# Patient Record
Sex: FEMALE | Race: WHITE | NOT HISPANIC OR LATINO | Employment: FULL TIME | URBAN - METROPOLITAN AREA
[De-identification: names, ages, dates, MRNs, and addresses within clinical notes are randomized per-mention and may not be internally consistent; named-entity substitution may affect disease eponyms.]

---

## 2017-09-15 ENCOUNTER — AMBULATORY - RIVER FALLS (OUTPATIENT)
Dept: FAMILY MEDICINE | Facility: CLINIC | Age: 22
End: 2017-09-15

## 2018-05-27 ENCOUNTER — HEALTH MAINTENANCE LETTER (OUTPATIENT)
Age: 23
End: 2018-05-27

## 2019-08-09 ENCOUNTER — OFFICE VISIT (OUTPATIENT)
Dept: PEDIATRIC HEMATOLOGY/ONCOLOGY | Facility: CLINIC | Age: 24
End: 2019-08-09
Attending: NURSE PRACTITIONER
Payer: COMMERCIAL

## 2019-08-09 VITALS
SYSTOLIC BLOOD PRESSURE: 115 MMHG | HEART RATE: 68 BPM | RESPIRATION RATE: 14 BRPM | BODY MASS INDEX: 21.1 KG/M2 | WEIGHT: 114.64 LBS | TEMPERATURE: 99 F | HEIGHT: 62 IN | DIASTOLIC BLOOD PRESSURE: 82 MMHG | OXYGEN SATURATION: 100 %

## 2019-08-09 DIAGNOSIS — Z85.858 HISTORY OF NEUROBLASTOMA: Primary | ICD-10-CM

## 2019-08-09 DIAGNOSIS — Z92.21 STATUS POST CHEMOTHERAPY: ICD-10-CM

## 2019-08-09 ASSESSMENT — MIFFLIN-ST. JEOR: SCORE: 1238.38

## 2019-08-09 ASSESSMENT — PAIN SCALES - GENERAL: PAINLEVEL: NO PAIN (0)

## 2019-08-09 NOTE — PROGRESS NOTES
We had the pleasure of seeing your patient, Cathie Miller, at the [facility] for childhood cancer survivors. The following is a summary of your patient's cancer, therapy, current history, and physical findings followed by assessment and long-term followup recommendations.     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 She was treated at the [facility] under the direction of Dr. Harris Pollock. [place] [facility] [place] on [place] 14, 1999. Cathie had one major surgical procedure involving resection of the right thoracic/paraspinal tumor on August 2, 1999. Additionally, she received chemotherapy as part of her treatment, and medications were as follows:   1. Cyclophosphamide intravenously with a cumulative dose of 1,980 mg/m2.   2. Etoposide intravenously with a cumulative dose of 1,080 mg/m2.   3. Doxorubicin intravenously with a cumulative dose of 60 mg/m2.   4. Carboplatin intravenously with a cumulative dose of 1.62 gm/m2.   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     History of Present Illness: Cathie reports to the visit alone.  Her parents are in the waiting room.  She was last seen 3 years ago.  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  She got a job with [employer] in [facility].  She starts at the end of this month.  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  She still notices this bump on her scalp but it has been there for years.  It has not changed.  Saw PCP last week and had normal UA.  BP at the upper limits.   No fever, night sweats or weight loss.  No joint pain.   Cathie denies recent illnesses or hospitalizations. The  patient denies bruising, bleeding, or swollen lymph nodes.  She has been having regular follow up with dermatology for her moles and acne.    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 There is a very mild ptosis and miosis on the right which is followed by ophthalmology yearly. She wears corrective lenses and has been doing so for the past 13 years.   Has some issues with dry eyes.   She denies problems with hearing.  Cathie goes to the dentist twice a year and has no problems with dental caries or gum bleeding.  She no longer has braces.   She denies exercise intolerance, dizziness or fainting spells. There are no abdominal, chest, or back pains. She denies fractures. Immunizations are  up-to-date. Appetite is good and sleeping well.      Review of Systems:  General:  No acute distress  Skin: moles, acne, small lump on left scalp  Eyes: contacts  Ears/Nose/Throat: negative  Respiratory: No shortness of breath, dyspnea on exertion, cough, or hemoptysis  Cardiovascular: negative  Gastrointestinal: negative  Genitourinary: negative  Musculoskeletal: negative  Neurologic: negative  Psychiatric: negative  Hematologic/Lymphatic/Immunologic: negative  Endocrine: negative    Social History: Cathie completed her associates degree in Nursing program and has a job with  with [employer] that starts at the end of the month.  Cathie started her menstrual cycles in 2011.  She denies any heavy bleeding or cramping.   She is very active at work and taking long walks.  Family History: MGM with MI .  Cathie's maternal great-grandmother was diagnosed with diabetes.     Current Medications:   Current Outpatient Medications   Medication     levonorgestrel-ethinyl estradiol (LUTERA) 0.1-20 MG-MCG per tablet     SPIRONOLACTONE PO     No current facility-administered medications for this visit.        Allergies:      Allergies   Allergen Reactions     Cats   "    Hay Fever & [A.R.M.]      Morphine      restless and agitated         Physical Examination: /82   Pulse 68   Temp 99  F (37.2  C) (Oral)   Resp 14   Ht 1.583 m (5' 2.32\")   Wt 52 kg (114 lb 10.2 oz)   SpO2 100%   BMI 20.75 kg/m      Wt Readings from Last 3 Encounters:   08/09/19 52 kg (114 lb 10.2 oz)   08/22/16 50.7 kg (111 lb 12.4 oz) (19 %)*   07/22/14 51.9 kg (114 lb 6.7 oz) (33 %)*     * Growth percentiles are based on CDC (Girls, 2-20 Years) data.     Ht Readings from Last 2 Encounters:   08/09/19 1.583 m (5' 2.32\")   08/22/16 1.582 m (5' 2.28\") (22 %)*     * Growth percentiles are based on CDC (Girls, 2-20 Years) data.     Normalized BMI data available only for age 0 to 20 years.      In general, Cathie appears to be in no acute distress. Left frontoparietal scalp with ~2x2cm defect noted-stable.  No overlying erythema or tenderness.   Sclerae are anicteric. Pupils are round and reactive to light, L>R. Bilateral tympanic membranes are within normal limits. Oropharynx is clear without lesions. Neck is supple, and thyroid is nonpalpable. Lungs are clear to auscultation bilaterally. Heart has regular rate and rhythm with no murmurs, rubs, or gallops. Abdomen is nondistended, nontender, and soft. No hepatosplenomegaly or palpable masses noted. Genitourinary exam deferred. Skin exam reveals no abnormal moles. No palpable masses are noted.    There is no palpable cervical, axillary, or inguinal lymphadenopathy.     Laboratory Studies:  None today.  Reviewed  UA from last week at PCP and WNL        Echocardiogram 2016  Interpretation Summary                                                                        Study ID: [id number]                                                [facility] [address] [address]     "                                           Phone: [phone]                               Pediatric Echocardiogram     ______________________________________________________________________________  Name: ANETA CHAMBERS  Study Date: 2016 10:01 AM                    Patient Location: [facility]  MRN: [id number]                                    Age: 21 yrs  : 1995                                    BP: 122/63 mmHg  Gender: Female                                     HR: 57  Patient Class: Outpatient                          Height: 158 cm  Ordering Provider: BRYN STOUT                       Weight: 51 kg  Referring Provider: BRYN STOUT LBSA: 1                    .5 m2  Performed By: Dori Noguera  Report approved by: Mackenzie Hernández MD  Reason For Study: , Personal history of malignant neoplasm of other endocrine  glan     ______________________________________________________________________________  CONCLUSIONS  Normal left and right ventricular systolic function. The calculated biplane  left ventricular ejection fraction is 67%. Estimated right ventricular  systolic pressure is 20 mmHg plus right atrial pressure.     ______________________________________________________________________________     Technical information:  A complete two dimensional, MMODE, spectral and color Doppler transthoracic  echocardiogram is performed. The study quality is good. Images are obtained  from parasternal, apical, subcostal and suprasternal notch views. ECG tracing  shows regular rhythm.     Segmental Anatomy:  There is normal atrial arrangement, with concordant atrioventricular and  ventriculoarterial connections.     Systemic and pulmonary veins:  Normal coronary sinus. The systemic venous return is not evaluated in this  study. The pulmonary venous return is not evaluated.     Atria and atrial septum:  Normal right atrial size. The left atrium is normal in size.  Atrioventricular valves:  The tricuspid valve is  normal in appearance and motion. Trivial tricuspid  valve insufficiency. Estimated right ventricular systolic pressure is 20 mmHg  plus right atrial pressure. The mitral valve is normal in appearance and  motion. There is no mitral valve insufficiency.     Ventricles and Ventricular Septum:  Normal right ventricular size. Normal right ventricular systolic function.  Normal left ventricular size. Normal left ventricular systolic function. The  calculated biplane left ventricular ejection fraction is 67 %.     Outflow tracts:  Normal great artery relationship. There is unobstructed flow through the  right ventricular outflow tract. The pulmonary valve motion is normal. There  is normal flow across the pulmonary valve. Trivial pulmonary valve  insufficiency. There is unobstructed flow through the left ventricular  outflow tract. Tricuspid aortic valve with normal appearance and motion.  There is normal flow across the aortic valve.     Great arteries:  The main pulmonary artery has normal appearance. There is unobstructed flow  in the main pulmonary artery. The pulmonary artery bifurcation is normal.  There is unobstructed flow in both branch pulmonary arteries. Normal  ascending aorta. The aortic arch appears normal. There is a left aortic arch  with normal branching pattern. There is unobstructed antegrade flow in the  ascending, transverse arch, descending thoracic and abdominal aorta.     Effusions, catheters, cannulas and leads:  No pericardial effusion.     MMode/2D Measurements & Calculations  LA dimension: 2.8 cm                  Ao root diam: 1.8 cm  LA/Ao: 1.5  Doppler Measurements & Calculations  MV E max amy: 90.7 cm/sec              Ao V2 max: 106.1 cm/sec  MV A max amy: 48.1 cm/sec              Ao max P.5 mmHg  MV E/A: 1.9  LV V1 max: 101.8 cm/sec                PA V2 max: 87.1 cm/sec  LV V1 max P.1 mmHg                 PA max PG: 3.0 mmHg  RV V1 max: 87.8 cm/sec                 TR max amy: 224.4  cm/sec  RV V1 max PG: 3.1 mmHg                 TR max P.1 mmHg  LPA max amy: 86.4 cm/sec  LPA max PG: 3.0 mmHg  RPA max amy: 73.4 cm/sec  RPA max P.2 mmHg     [place] 2D Z-SCORE VALUES  +----------------+------+-------+---------+------------+  :Measurement Name:Value :Z-Score:Predicted:Normal Range:  +----------------+------+-------+---------+------------+  :LVLd apical(4ch):7.8 cm:0.94   :7.2      :6.1 - 8.4   :  +----------------+------+-------+---------+------------+  :LVLs apical(4ch):6.3 cm:0.68   :5.9      :4.9 - 6.9   :  +----------------+------+-------+---------+------------+  [place] Z-Scores (Measurements & Calculations)  +----------------+----------+-------+---------+------------+  :Measurement Name:Value     :Z-Score:Predicted:Normal Range:  +----------------+----------+-------+---------+------------+  :IVSd(MM)        :0.60 cm   :-2.0   :0.87     :0.62 - 1.13 :  +----------------+----------+-------+---------+------------+  :IVSs(MM)        :0.91 cm   :-1.9   :1.2      :0.89 - 1.52 :  +----------------+----------+-------+---------+------------+  :LVIDd(MM)       :4.6 cm    :-0.09  :4.7      :4.0 - 5.3   :  +----------------+----------+-------+---------+------------+  :LVIDs(MM)       :2.8 cm    :-0.70  :3.0      :2.4 - 3.6   :  +----------------+----------+-------+---------+------------+  :LVPWd(MM)       :0.57 cm   :-2.2   :0.82     :0.60 - 1.04 :  +----------------+----------+-------+---------+------------+  :LVPWs(MM)       :1.3 cm    :-0.48  :1.4      :1.1 - 1.7   :  +----------------+----------+-------+---------+------------+  :LV mass(C)d(MM) :81.1 grams:-2.2   :125.6    :85.7 - 184.2:  +----------------+----------+-------+---------+------------+  :FS(MM)          :39.9 %    :1.3    :34.9     :28.7 - 42.6 :  +----------------+----------+-------+---------+------------+           Report approved by: Krys Lynch 2016 01:11 PM                    Assessment, Plan, and Long-Term  Followup Recommendations: Overall, Cathie is doing extremely well status post her treatment for neuroblastoma that was approximately 19 years ago. BP was at the upper limits of normal but ok on the repeat. The following are our late effects recommendations.   1. Risk for recurrence: Cathie is currently 21 years status post treatment for her low-risk neuroblastoma. Given the distance from her diagnosis of the primary malignancy, the likelihood of recurrence is extremely low.    2. Psychosocial effects, learning, and memory: Cathie was treated with chemotherapy at a young age. However, she is doing extremely well in school. She has no concerns regarding her mood, and we feel she is at low risk for any psychosocial difficulties.     3. Risk for dental abnormalities: Cathie received chemotherapy at a young age which makes her at a slightly increased risk of having dental problems, including increased dental caries. Cathie currently has no cavities and reports brushing and visiting the dentist as per recommendations. She should continue to have dental examination and general cleaning every six months.   4. Risk for cardiac toxicities: Cathie received a low amount of doxorubicin in the area of 60 mg/m2 at the age of 4. Per the [facility] guidelines, we recommend echocardiograms be completed every five years. She had an echocardiogram completed in 2011 which is within normal limits.  Repeat done today and normal.  We will perform again in 5 years.  5. Risk for kidney problems: Cathie should have an annual physical examination, including urinalysis and blood pressure monitoring.  Cathie was found to have benign orthostatic proteinuria at a prior visit. BMP and UA done with PCP recently normal.  She should have a UA yearly to assess her renal function.  6. Endocrinology issues: Cathie received a small amount of alkylating agents which has sometimes been found to affect fertility and pubertal progression. Cathie is having normal  menstrual cycles.  If her cycles become irregular we would recommend checking gonadotropins.   7. Risk for hearing loss: Cathie was treated with the heavy metal agent carboplatin which does put patients at risk for high-frequency hearing loss. Cathie does not report any problems with her hearing, and she has been doing extremely well. We would recommend she have an audiogram should she notice any changes in her hearing.   8.  Katiuska's syndrome:  Mild and no current issues.  9.  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 Stable compared with prior.    It was a pleasure to see Cathie and her family today in the Long-Term Follow-Up Clinic. We certainly appreciate the opportunity to participate in your patient's care. If you have any questions or comments, please do not hesitate to contact us at [phone]. We ask that Cathie return to our Long-Term Follow-Up Clinic in 2 year's time.  We will get an echo at that visit.    Brii Foreman MSN, APRN, CPNP-AC, CPON  Department of Pediatrics  Division of Hematology/ Oncology

## 2019-08-09 NOTE — LETTER
8/9/2019      RE: Cathie Miller   [facility] [id number]       We had the pleasure of seeing your patient, Cathie Miller, at the [facility]Term [facility] for childhood cancer survivors. The following is a summary of your patient's cancer, therapy, current history, and physical findings followed by assessment and long-term followup recommendations.     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 She was treated at the [facility] under the direction of Dr. Harris Pollock. Cathie was treated on study [facility] , and treatment was completed on October 14, 1999. Cathie had one major surgical procedure involving resection of the right thoracic/paraspinal tumor on August 2, 1999. Additionally, she received chemotherapy as part of her treatment, and medications were as follows:   1. Cyclophosphamide intravenously with a cumulative dose of 1,980 mg/m2.   2. Etoposide intravenously with a cumulative dose of 1,080 mg/m2.   3. Doxorubicin intravenously with a cumulative dose of 60 mg/m2.   4. Carboplatin intravenously with a cumulative dose of 1.62 gm/m2.   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     History of Present Illness: Cathie reports to the visit alone.  Her parents are in the waiting room.  She was last seen 3 years ago.  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  She got a job with [employer] in [facility].  She starts at the end of this month.  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  She still notices this bump on her scalp but it has been there for years.  It has not changed.  Saw PCP last week and had normal UA.  BP at the upper limits.   No fever, night sweats or weight  loss.  No joint pain.   Cathie denies recent illnesses or hospitalizations. The patient denies bruising, bleeding, or swollen lymph nodes.  She has been having regular follow up with dermatology for her moles and acne.    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 There is a very mild ptosis and miosis on the right which is followed by ophthalmology yearly. She wears corrective lenses and has been doing so for the past 13 years.   Has some issues with dry eyes.   She denies problems with hearing.  Cathie goes to the dentist twice a year and has no problems with dental caries or gum bleeding.  She no longer has braces.   She denies exercise intolerance, dizziness or fainting spells. There are no abdominal, chest, or back pains. She denies fractures. Immunizations are  up-to-date. Appetite is good and sleeping well.      Review of Systems:  General:  No acute distress  Skin: moles, acne, small lump on left scalp  Eyes: contacts  Ears/Nose/Throat: negative  Respiratory: No shortness of breath, dyspnea on exertion, cough, or hemoptysis  Cardiovascular: negative  Gastrointestinal: negative  Genitourinary: negative  Musculoskeletal: negative  Neurologic: negative  Psychiatric: negative  Hematologic/Lymphatic/Immunologic: negative  Endocrine: negative    Social History: Cathie completed her associates degree in Nursing program and has a job with  with [employer] that starts at the end of the month.  Cathie started her menstrual cycles in 2011.  She denies any heavy bleeding or cramping.   She is very active at work and taking long walks.  Family History: MGM with MI .  Cathie's maternal great-grandmother was diagnosed with diabetes.     Current Medications:   Current Outpatient Medications   Medication     levonorgestrel-ethinyl estradiol (LUTERA) 0.1-20 MG-MCG per tablet     SPIRONOLACTONE PO     No current facility-administered medications  "for this visit.        Allergies:      Allergies   Allergen Reactions     Cats      Hay Fever & [A.R.M.]      Morphine      restless and agitated         Physical Examination: /82   Pulse 68   Temp 99  F (37.2  C) (Oral)   Resp 14   Ht 1.583 m (5' 2.32\")   Wt 52 kg (114 lb 10.2 oz)   SpO2 100%   BMI 20.75 kg/m       Wt Readings from Last 3 Encounters:   08/09/19 52 kg (114 lb 10.2 oz)   08/22/16 50.7 kg (111 lb 12.4 oz) (19 %)*   07/22/14 51.9 kg (114 lb 6.7 oz) (33 %)*     * Growth percentiles are based on CDC (Girls, 2-20 Years) data.     Ht Readings from Last 2 Encounters:   08/09/19 1.583 m (5' 2.32\")   08/22/16 1.582 m (5' 2.28\") (22 %)*     * Growth percentiles are based on CDC (Girls, 2-20 Years) data.     Normalized BMI data available only for age 0 to 20 years.      In general, Cathie appears to be in no acute distress. Left frontoparietal scalp with ~2x2cm defect noted-stable.  No overlying erythema or tenderness.   Sclerae are anicteric. Pupils are round and reactive to light, L>R. Bilateral tympanic membranes are within normal limits. Oropharynx is clear without lesions. Neck is supple, and thyroid is nonpalpable. Lungs are clear to auscultation bilaterally. Heart has regular rate and rhythm with no murmurs, rubs, or gallops. Abdomen is nondistended, nontender, and soft. No hepatosplenomegaly or palpable masses noted. Genitourinary exam deferred. Skin exam reveals no abnormal moles. No palpable masses are noted.    There is no palpable cervical, axillary, or inguinal lymphadenopathy.     Laboratory Studies:  None today.  Reviewed  UA from last week at PCP and WNL        Echocardiogram 2016  Interpretation Summary                                                                        Study ID: [id number]                                                [facility]                                                 [address]                                               [place] [address]                                               Phone: [phone]                               Pediatric Echocardiogram     ______________________________________________________________________________  Name: ANETA CHAMBERS  Study Date: 2016 10:01 AM                    Patient Location: [facility]  MRN: [id number]                                    Age: 21 yrs  : 1995                                    BP: 122/63 mmHg  Gender: Female                                     HR: 57  Patient Class: Outpatient                          Height: 158 cm  Ordering Provider: BRYN STOUT                       Weight: 51 kg  Referring Provider: BRYN STOUT LBSA: 1                    .5 m2  Performed By: Dori Noguera  Report approved by: Mackenzie Hernández MD  Reason For Study: , Personal history of malignant neoplasm of other endocrine  glan     ______________________________________________________________________________  CONCLUSIONS  Normal left and right ventricular systolic function. The calculated biplane  left ventricular ejection fraction is 67%. Estimated right ventricular  systolic pressure is 20 mmHg plus right atrial pressure.     ______________________________________________________________________________     Technical information:  A complete two dimensional, MMODE, spectral and color Doppler transthoracic  echocardiogram is performed. The study quality is good. Images are obtained  from parasternal, apical, subcostal and suprasternal notch views. ECG tracing  shows regular rhythm.     Segmental Anatomy:  There is normal atrial arrangement, with concordant atrioventricular and  ventriculoarterial connections.     Systemic and pulmonary veins:  Normal coronary sinus. The systemic venous return is not evaluated in this  study. The pulmonary venous return is not evaluated.     Atria and atrial septum:  Normal right atrial size.  The left atrium is normal in size.  Atrioventricular valves:  The tricuspid valve is normal in appearance and motion. Trivial tricuspid  valve insufficiency. Estimated right ventricular systolic pressure is 20 mmHg  plus right atrial pressure. The mitral valve is normal in appearance and  motion. There is no mitral valve insufficiency.     Ventricles and Ventricular Septum:  Normal right ventricular size. Normal right ventricular systolic function.  Normal left ventricular size. Normal left ventricular systolic function. The  calculated biplane left ventricular ejection fraction is 67 %.     Outflow tracts:  Normal great artery relationship. There is unobstructed flow through the  right ventricular outflow tract. The pulmonary valve motion is normal. There  is normal flow across the pulmonary valve. Trivial pulmonary valve  insufficiency. There is unobstructed flow through the left ventricular  outflow tract. Tricuspid aortic valve with normal appearance and motion.  There is normal flow across the aortic valve.     Great arteries:  The main pulmonary artery has normal appearance. There is unobstructed flow  in the main pulmonary artery. The pulmonary artery bifurcation is normal.  There is unobstructed flow in both branch pulmonary arteries. Normal  ascending aorta. The aortic arch appears normal. There is a left aortic arch  with normal branching pattern. There is unobstructed antegrade flow in the  ascending, transverse arch, descending thoracic and abdominal aorta.     Effusions, catheters, cannulas and leads:  No pericardial effusion.     MMode/2D Measurements & Calculations  LA dimension: 2.8 cm                  Ao root diam: 1.8 cm  LA/Ao: 1.5  Doppler Measurements & Calculations  MV E max amy: 90.7 cm/sec              Ao V2 max: 106.1 cm/sec  MV A max amy: 48.1 cm/sec              Ao max P.5 mmHg  MV E/A: 1.9  LV V1 max: 101.8 cm/sec                PA V2 max: 87.1 cm/sec  LV V1 max P.1 mmHg                  PA max PG: 3.0 mmHg  RV V1 max: 87.8 cm/sec                 TR max amy: 224.4 cm/sec  RV V1 max PG: 3.1 mmHg                 TR max P.1 mmHg  LPA max amy: 86.4 cm/sec  LPA max PG: 3.0 mmHg  RPA max amy: 73.4 cm/sec  RPA max P.2 mmHg     [place] 2D Z-SCORE VALUES  +----------------+------+-------+---------+------------+  :Measurement Name:Value :Z-Score:Predicted:Normal Range:  +----------------+------+-------+---------+------------+  :LVLd apical(4ch):7.8 cm:0.94   :7.2      :6.1 - 8.4   :  +----------------+------+-------+---------+------------+  :LVLs apical(4ch):6.3 cm:0.68   :5.9      :4.9 - 6.9   :  +----------------+------+-------+---------+------------+  [place] Z-Scores (Measurements & Calculations)  +----------------+----------+-------+---------+------------+  :Measurement Name:Value     :Z-Score:Predicted:Normal Range:  +----------------+----------+-------+---------+------------+  :IVSd(MM)        :0.60 cm   :-2.0   :0.87     :0.62 - 1.13 :  +----------------+----------+-------+---------+------------+  :IVSs(MM)        :0.91 cm   :-1.9   :1.2      :0.89 - 1.52 :  +----------------+----------+-------+---------+------------+  :LVIDd(MM)       :4.6 cm    :-0.09  :4.7      :4.0 - 5.3   :  +----------------+----------+-------+---------+------------+  :LVIDs(MM)       :2.8 cm    :-0.70  :3.0      :2.4 - 3.6   :  +----------------+----------+-------+---------+------------+  :LVPWd(MM)       :0.57 cm   :-2.2   :0.82     :0.60 - 1.04 :  +----------------+----------+-------+---------+------------+  :LVPWs(MM)       :1.3 cm    :-0.48  :1.4      :1.1 - 1.7   :  +----------------+----------+-------+---------+------------+  :LV mass(C)d(MM) :81.1 grams:-2.2   :125.6    :85.7 - 184.2:  +----------------+----------+-------+---------+------------+  :FS(MM)          :39.9 %    :1.3    :34.9     :28.7 - 42.6 :  +----------------+----------+-------+---------+------------+           Report approved by: Mackenzie  Krys Hernández 08/22/2016 01:11 PM                    Assessment, Plan, and Long-Term Followup Recommendations: Overall, Cathie is doing extremely well status post her treatment for neuroblastoma that was approximately 19 years ago. BP was at the upper limits of normal but ok on the repeat. The following are our late effects recommendations.   1. Risk for recurrence: Cathie is currently 21 years status post treatment for her low-risk neuroblastoma. Given the distance from her diagnosis of the primary malignancy, the likelihood of recurrence is extremely low.    2. Psychosocial effects, learning, and memory: Cathie was treated with chemotherapy at a young age. However, she is doing extremely well in school. She has no concerns regarding her mood, and we feel she is at low risk for any psychosocial difficulties.     3. Risk for dental abnormalities: Cathie received chemotherapy at a young age which makes her at a slightly increased risk of having dental problems, including increased dental caries. Cathie currently has no cavities and reports brushing and visiting the dentist as per recommendations. She should continue to have dental examination and general cleaning every six months.   4. Risk for cardiac toxicities: Cathie received a low amount of doxorubicin in the area of 60 mg/m2 at the age of 4. Per the [facility] guidelines, we recommend echocardiograms be completed every five years. She had an echocardiogram completed in 2011 which is within normal limits.  Repeat done today and normal.  We will perform again in 5 years.  5. Risk for kidney problems: Cathie should have an annual physical examination, including urinalysis and blood pressure monitoring.  Cathie was found to have benign orthostatic proteinuria at a prior visit. BMP and UA done with PCP recently normal.  She should have a UA yearly to assess her renal function.  6. Endocrinology issues: Cathie received a small amount of alkylating agents which has  sometimes been found to affect fertility and pubertal progression. Cathie is having normal menstrual cycles.  If her cycles become irregular we would recommend checking gonadotropins.   7. Risk for hearing loss: Cathie was treated with the heavy metal agent carboplatin which does put patients at risk for high-frequency hearing loss. Cathie does not report any problems with her hearing, and she has been doing extremely well. We would recommend she have an audiogram should she notice any changes in her hearing.   8.  Katiuska's syndrome:  Mild and no current issues.  9.  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 Stable compared with prior.    It was a pleasure to see Cathie and her family today in the Long-Term Follow-Up Clinic. We certainly appreciate the opportunity to participate in your patient's care. If you have any questions or comments, please do not hesitate to contact us at [phone]. We ask that Cathie return to our Long-Term Follow-Up Clinic in 2 year's time.  We will get an echo at that visit.    Brii Foreman MSN, APRN, CPNP-AC, CPON  Department of Pediatrics  Division of Hematology/ Oncology

## 2019-11-02 ENCOUNTER — HEALTH MAINTENANCE LETTER (OUTPATIENT)
Age: 24
End: 2019-11-02

## 2020-11-14 ENCOUNTER — HEALTH MAINTENANCE LETTER (OUTPATIENT)
Age: 25
End: 2020-11-14

## 2021-04-14 DIAGNOSIS — Z91.89 AT RISK FOR CARDIOMYOPATHY: ICD-10-CM

## 2021-04-14 DIAGNOSIS — Z85.858 HISTORY OF NEUROBLASTOMA: Primary | ICD-10-CM

## 2021-04-14 DIAGNOSIS — Z92.21 STATUS POST CHEMOTHERAPY: ICD-10-CM

## 2021-04-20 ENCOUNTER — HOSPITAL ENCOUNTER (OUTPATIENT)
Dept: CARDIOLOGY | Facility: CLINIC | Age: 26
Discharge: HOME | End: 2021-04-20
Attending: NURSE PRACTITIONER | Admitting: NURSE PRACTITIONER
Payer: COMMERCIAL

## 2021-04-20 ENCOUNTER — OFFICE VISIT (OUTPATIENT)
Dept: PEDIATRIC HEMATOLOGY/ONCOLOGY | Facility: CLINIC | Age: 26
End: 2021-04-20
Attending: NURSE PRACTITIONER
Payer: COMMERCIAL

## 2021-04-20 VITALS
WEIGHT: 113.1 LBS | SYSTOLIC BLOOD PRESSURE: 136 MMHG | RESPIRATION RATE: 20 BRPM | HEIGHT: 63 IN | DIASTOLIC BLOOD PRESSURE: 80 MMHG | HEART RATE: 65 BPM | OXYGEN SATURATION: 100 % | TEMPERATURE: 98.2 F | BODY MASS INDEX: 20.04 KG/M2

## 2021-04-20 DIAGNOSIS — Z85.858 HISTORY OF NEUROBLASTOMA: ICD-10-CM

## 2021-04-20 DIAGNOSIS — Z92.21 STATUS POST CHEMOTHERAPY: ICD-10-CM

## 2021-04-20 DIAGNOSIS — Z91.89 AT RISK FOR CARDIOMYOPATHY: ICD-10-CM

## 2021-04-20 DIAGNOSIS — Z85.858 HISTORY OF NEUROBLASTOMA: Primary | ICD-10-CM

## 2021-04-20 PROCEDURE — 93306 TTE W/DOPPLER COMPLETE: CPT | Mod: 26 | Performed by: PEDIATRICS

## 2021-04-20 PROCEDURE — G0463 HOSPITAL OUTPT CLINIC VISIT: HCPCS

## 2021-04-20 PROCEDURE — 99214 OFFICE O/P EST MOD 30 MIN: CPT | Performed by: NURSE PRACTITIONER

## 2021-04-20 PROCEDURE — 93306 TTE W/DOPPLER COMPLETE: CPT

## 2021-04-20 RX ORDER — FLUTICASONE PROPIONATE 50 MCG
2 SPRAY, SUSPENSION (ML) NASAL
COMMUNITY
Start: 2020-08-04

## 2021-04-20 ASSESSMENT — MIFFLIN-ST. JEOR: SCORE: 1225.12

## 2021-04-20 ASSESSMENT — PAIN SCALES - GENERAL: PAINLEVEL: NO PAIN (0)

## 2021-04-20 NOTE — LETTER
4/20/2021      RE: Cathie Miller   [facility] [id number]       We had the pleasure of seeing your patient, Cathie Miller, at the [facility] Long[facility]Term Followup Clinic for childhood cancer survivors. The following is a summary of your patient's cancer, therapy, current history, and physical findings followed by assessment and long-term followup recommendations.     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 She was treated at the [facility] under the direction of Dr. Harris Pollock. Cathie was treated on study [facility] , and treatment was completed on October 14, 1999. Cathie had one major surgical procedure involving resection of the right thoracic/paraspinal tumor on August 2, 1999. Additionally, she received chemotherapy as part of her treatment, and medications were as follows:   1. Cyclophosphamide intravenously with a cumulative dose of 1,980 mg/m2.   2. Etoposide intravenously with a cumulative dose of 1,080 mg/m2.   3. Doxorubicin intravenously with a cumulative dose of 60 mg/m2.   4. Carboplatin intravenously with a cumulative dose of 1.62 gm/m2.   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     History of Present Illness: Cathie reports to the visit alone.    She was last seen 2 years ago.  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  She is now working at [employer] the [employer] full time.  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  She still has the bump on her scalp but it hasn't changed.  No fever, night sweats or weight loss.  No joint pain.  Has not had COVID since the pandemic started and did get vaccinated in January 2021.   Cathie denies recent illnesses or hospitalizations. The patient denies bruising, bleeding, or  swollen lymph nodes.  She has been having regular follow up with dermatology for her moles and acne.    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 There is a very mild ptosis and miosis on the right which is followed by ophthalmology yearly. She wears corrective lenses and has been doing so for the past 15 years.   Has some issues with dry eyes.   She denies problems with hearing.  Cathie goes to the dentist twice a year and has no problems with dental caries or gum bleeding.  She no longer has braces.   She denies exercise intolerance, dizziness or fainting spells. There are no abdominal, chest, or back pains. She denies fractures. Immunizations are  up-to-date. Appetite is good and sleeping well.      Review of Systems:  General:  No acute distress  Skin: moles, acne, small lump on left scalp  Eyes: contacts  Ears/Nose/Throat: negative  Respiratory: No shortness of breath, dyspnea on exertion, cough, or hemoptysis  Cardiovascular: negative  Gastrointestinal: negative  Genitourinary: negative  Musculoskeletal: negative  Neurologic: negative  Psychiatric: negative  Hematologic/Lymphatic/Immunologic: negative  Endocrine: negative    Social History: Cathie completed her associates degree in Nursing program and has a job with  with [employer] in the [employer].  Cathie started her menstrual cycles in 2011.  She denies any heavy bleeding or cramping.   She is very active at work and taking long walks.  Family History: MGM with MI .  Cathie's maternal great-grandmother was diagnosed with diabetes. No change.    Current Medications:   Current Outpatient Medications   Medication     fluticasone (FLONASE) 50 MCG/ACT nasal spray     levonorgestrel-ethinyl estradiol (LUTERA) 0.1-20 MG-MCG per tablet     SPIRONOLACTONE PO     No current facility-administered medications for this visit.        Allergies:      Allergies   Allergen  "Reactions     Cats      Hay Fever & [A.R.M.]      Morphine      restless and agitated         Physical Examination: /80 (BP Location: Right arm, Patient Position: Sitting, Cuff Size: Adult Regular)   Pulse 65   Temp 98.2  F (36.8  C) (Oral)   Resp 20   Ht 1.589 m (5' 2.56\")   Wt 51.3 kg (113 lb 1.5 oz)   SpO2 100%   BMI 20.32 kg/m      Wt Readings from Last 3 Encounters:   04/20/21 51.3 kg (113 lb 1.5 oz)   08/09/19 52 kg (114 lb 10.2 oz)   08/22/16 50.7 kg (111 lb 12.4 oz) (19 %, Z= -0.88)*     * Growth percentiles are based on [facility] (Girls, 2-20 Years) data.     Ht Readings from Last 2 Encounters:   04/20/21 1.589 m (5' 2.56\")   08/09/19 1.583 m (5' 2.32\")     Facility age limit for growth percentiles is 20 years.      In general, Cathie appears to be in no acute distress. Left frontoparietal scalp with ~2x2cm defect noted-stable.  No overlying erythema or tenderness.   Sclerae are anicteric. Pupils are round and reactive to light, L>R. Bilateral tympanic membranes are within normal limits. Oropharynx is clear without lesions. Neck is supple, and thyroid is nonpalpable. Lungs are clear to auscultation bilaterally. Heart has regular rate and rhythm with no murmurs, rubs, or gallops. Abdomen is nondistended, nontender, and soft. No hepatosplenomegaly or palpable masses noted. Genitourinary exam deferred. Skin exam reveals no abnormal moles. No palpable masses are noted.    There is no palpable cervical, axillary, or inguinal lymphadenopathy.     Laboratory Studies:  None today.          Echocardiogram 2021  Results for orders placed or performed during the hospital encounter of 04/20/21   Echo Pediatric (TTE) Complete     Status: None    Narrative    [id number]  [id number]  [id number]  [id number]^ARGELIA^BRYN^TERRANCE                                                               Study ID: [id number]                                                 [facility] " [address]                                                [place] [address]                                                Phone: [phone]                                Pediatric Echocardiogram  ______________________________________________________________________________  Name: ANETA CHAMBERS  Study Date: 2021 01:52 PM             Patient Location: [facility]  MRN: [id number]                             Age: 26 yrs  : 1995  Gender: Female                              HR: 73  Patient Class: Outpatient                   Height: 158 cm  Ordering Provider: BRYN STOUT              Weight: 52 kg  Referring Provider: BRYN STOUT             BSA: 1.5 m2  Performed By: Marco Bal  Report approved by: Mackenzie Hernández MD  Reason For Study: Evaluate LVEF  ______________________________________________________________________________  ##### CONCLUSIONS #####  Normal left and right ventricular systolic function. The calculated biplane  left ventricular ejection fraction is 67%. Estimated right ventricular  systolic pressure is 19 mmHg plus right atrial pressure.  ______________________________________________________________________________  Technical information:  A complete two dimensional, MMODE, spectral and color Doppler transthoracic  echocardiogram is performed. The study quality is good. Images are obtained  from parasternal, apical, subcostal and suprasternal notch views. Prior  echocardiogram available for comparison. ECG tracing shows regular rhythm.     Segmental Anatomy:  There is normal atrial arrangement, with concordant atrioventricular and  ventriculoarterial connections.     Systemic and pulmonary veins:  Normal coronary sinus. The systemic venous return is not evaluated in this  study. The pulmonary venous return is not evaluated.     Atria and atrial septum:  Normal right atrial size. The left atrium is normal  in size.     Atrioventricular valves:  The tricuspid valve is normal in appearance and motion. Trivial tricuspid  valve insufficiency. Estimated right ventricular systolic pressure is 19 mmHg  plus right atrial pressure. The mitral valve is normal in appearance and  motion. Trivial mitral valve insufficiency.     Ventricles and Ventricular Septum:  Normal right ventricular size. Normal right ventricular systolic function.  Normal left ventricular size. Normal left ventricular systolic function. The  calculated biplane left ventricular ejection fraction is 69 %.     Outflow tracts:  Normal great artery relationship. There is unobstructed flow through the right  ventricular outflow tract. The pulmonary valve motion is normal. There is  normal flow across the pulmonary valve. Trivial pulmonary valve insufficiency.  There is unobstructed flow through the left ventricular outflow tract.  Tricuspid aortic valve with normal appearance and motion. There is normal flow  across the aortic valve.     Great arteries:  The main pulmonary artery has normal appearance. There is unobstructed flow in  the main pulmonary artery. The pulmonary artery bifurcation is normal. There  is unobstructed flow in both branch pulmonary arteries. Normal ascending  aorta. The aortic arch appears normal. There is a left aortic arch with normal  branching pattern. There is unobstructed antegrade flow in the ascending,  transverse arch, descending thoracic and abdominal aorta.     Effusions, catheters, cannulas and leads:  No pericardial effusion.     MMode/2D Measurements & Calculations  LA dimension: 3.1 cm                       Ao root diam: 2.6 cm     LA/Ao: 1.2                                             LVLd %diff: 6.2 %                                             LVLs %diff: -0.03 %                                             EF(MOD-bp): 69.3 %  LVMI(BSA): 85.6 grams/m2                   LVMI(Height): 37.6  RWT(MM): 0.42     Doppler  Measurements & Calculations  Ao V2 max: 111.1 cm/sec                LV V1 max: 101.2 cm/sec  Ao max P.9 mmHg                    LV V1 max P.1 mmHg  RV V1 max: 82.4 cm/sec  RV V1 max P.7 mmHg     MPA max amy: 83.9 cm/sec  MPA max P.8 mmHg     [place] Z-Scores (Measurements & Calculations)  Measurement NameValue      Z-ScorePredictedNormal Range  IVSd(MM)        0.88 cm    0.02   0.88     0.62 - 1.14  LVIDd(MM)       4.4 cm     -0.82  4.7      4.0 - 5.3  LVIDs(MM)       2.6 cm     -1.4   3.0      2.4 - 3.6  LVPWd(MM)       0.93 cm    0.99   0.82     0.61 - 1.04  LV mass(C)d(MM) 129.4 grams0.08   127.5    86.9 - 187.0  FS(MM)          41.3 %     1.6    34.8     28.3 - 42.7     Report approved by: Krys Lynch 2021 02:19 PM                 Assessment, Plan, and Long-Term Followup Recommendations: Overall, Cathie is doing extremely well status post her treatment for neuroblastoma that was approximately 21 years ago. BP was at the upper limits of normal but ok on the repeat. The following are our late effects recommendations.   1. Risk for recurrence: Cathie is currently 21 years status post treatment for her low-risk neuroblastoma. Given the distance from her diagnosis of the primary malignancy, the likelihood of recurrence is extremely low.    2. Psychosocial effects, learning, and memory: Cathie was treated with chemotherapy at a young age. However, she is doing extremely well in school. She has no concerns regarding her mood, and we feel she is at low risk for any psychosocial difficulties.     3. Risk for dental abnormalities: Cathie received chemotherapy at a young age which makes her at a slightly increased risk of having dental problems, including increased dental caries. Cathie currently has no cavities and reports brushing and visiting the dentist as per recommendations. She should continue to have dental examination and general cleaning every six months.   4. Risk for cardiac toxicities:  Cathie received a low amount of doxorubicin in the area of 60 mg/m2 at the age of 4. Per the [facility] guidelines, we recommend echocardiograms be completed every five years. She had an echocardiogram completed in 2011 which is within normal limits.  Repeat done today and normal.  We will perform again in 5 years.  5. Risk for kidney problems: Cathie should have an annual physical examination, including urinalysis and blood pressure monitoring.  Cathie was found to have benign orthostatic proteinuria at a prior visit. BMP and UA done with PCP recently normal.  No concerns on history.  Will continue to monitor.  6. Endocrinology issues: Cathie received a small amount of alkylating agents which has sometimes been found to affect fertility and pubertal progression. Cathie is having normal menstrual cycles.  If her cycles become irregular we would recommend checking gonadotropins.   7. Risk for hearing loss: Cathie was treated with the heavy metal agent carboplatin which does put patients at risk for high-frequency hearing loss. Cathie does not report any problems with her hearing, and she has been doing extremely well. We would recommend she have an audiogram should she notice any changes in her hearing.   8.  Katiuska's syndrome:  Mild and no current issues.  9.  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 Stable compared with prior.    It was a pleasure to see Cathie and her family today in the Long-Term Follow-Up Clinic. We certainly appreciate the opportunity to participate in your patient's care. If you have any questions or comments, please do not hesitate to contact us at [phone]. We ask that Cathie return to our Long-Term Follow-Up Clinic in 2 year's time.      Brii Foreman MSN, APRN, CPNP-AC, CPON  Department of Pediatrics  Division of Hematology/ Oncology    Review of the result(s) of each unique test - echocardiogram  30 minutes spent on the date of the encounter doing  chart review, history and exam, documentation and further activities per the note          Brii Foreman, APRN CNP

## 2021-04-20 NOTE — PROGRESS NOTES
We had the pleasure of seeing your patient, Cathie Miller, at the [facility] for childhood cancer survivors. The following is a summary of your patient's cancer, therapy, current history, and physical findings followed by assessment and long-term followup recommendations.     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 She was treated at the [facility] under the direction of Dr. Harris Pollock. Cathie was treated on study [facility] , and treatment was completed on October 14, 1999. Cathie had one major surgical procedure involving resection of the right thoracic/paraspinal tumor on August 2, 1999. Additionally, she received chemotherapy as part of her treatment, and medications were as follows:   1. Cyclophosphamide intravenously with a cumulative dose of 1,980 mg/m2.   2. Etoposide intravenously with a cumulative dose of 1,080 mg/m2.   3. Doxorubicin intravenously with a cumulative dose of 60 mg/m2.   4. Carboplatin intravenously with a cumulative dose of 1.62 gm/m2.   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     History of Present Illness: Cathie reports to the visit alone.    She was last seen 2 years ago.  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  She is now working at [employer] in the [employer] full time.  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  She still has the bump on her scalp but it hasn't changed.  No fever, night sweats or weight loss.  No joint pain.  Has not had COVID since the pandemic started and did get vaccinated in January 2021.   Cathie denies recent illnesses or hospitalizations. The patient denies bruising, bleeding, or swollen lymph nodes.  She has been having regular follow up with dermatology for  her moles and acne.    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 There is a very mild ptosis and miosis on the right which is followed by ophthalmology yearly. She wears corrective lenses and has been doing so for the past 15 years.   Has some issues with dry eyes.   She denies problems with hearing.  Cathie goes to the dentist twice a year and has no problems with dental caries or gum bleeding.  She no longer has braces.   She denies exercise intolerance, dizziness or fainting spells. There are no abdominal, chest, or back pains. She denies fractures. Immunizations are  up-to-date. Appetite is good and sleeping well.      Review of Systems:  General:  No acute distress  Skin: moles, acne, small lump on left scalp  Eyes: contacts  Ears/Nose/Throat: negative  Respiratory: No shortness of breath, dyspnea on exertion, cough, or hemoptysis  Cardiovascular: negative  Gastrointestinal: negative  Genitourinary: negative  Musculoskeletal: negative  Neurologic: negative  Psychiatric: negative  Hematologic/Lymphatic/Immunologic: negative  Endocrine: negative    Social History: Cathie completed her associates degree in Nursing program and has a job with  with [employer] in the [employer].  Cathie started her menstrual cycles in 2011.  She denies any heavy bleeding or cramping.   She is very active at work and taking long walks.  Family History: MGM with MI .  Cathie's maternal great-grandmother was diagnosed with diabetes. No change.    Current Medications:   Current Outpatient Medications   Medication     fluticasone (FLONASE) 50 MCG/ACT nasal spray     levonorgestrel-ethinyl estradiol (LUTERA) 0.1-20 MG-MCG per tablet     SPIRONOLACTONE PO     No current facility-administered medications for this visit.        Allergies:      Allergies   Allergen Reactions     Cats      Hay Fever & [A.R.M.]      Morphine      restless and agitated  "        Physical Examination: /80 (BP Location: Right arm, Patient Position: Sitting, Cuff Size: Adult Regular)   Pulse 65   Temp 98.2  F (36.8  C) (Oral)   Resp 20   Ht 1.589 m (5' 2.56\")   Wt 51.3 kg (113 lb 1.5 oz)   SpO2 100%   BMI 20.32 kg/m      Wt Readings from Last 3 Encounters:   04/20/21 51.3 kg (113 lb 1.5 oz)   08/09/19 52 kg (114 lb 10.2 oz)   08/22/16 50.7 kg (111 lb 12.4 oz) (19 %, Z= -0.88)*     * Growth percentiles are based on [facility] (Girls, 2-20 Years) data.     Ht Readings from Last 2 Encounters:   04/20/21 1.589 m (5' 2.56\")   08/09/19 1.583 m (5' 2.32\")     Facility age limit for growth percentiles is 20 years.      In general, Cathie appears to be in no acute distress. Left frontoparietal scalp with ~2x2cm defect noted-stable.  No overlying erythema or tenderness.   Sclerae are anicteric. Pupils are round and reactive to light, L>R. Bilateral tympanic membranes are within normal limits. Oropharynx is clear without lesions. Neck is supple, and thyroid is nonpalpable. Lungs are clear to auscultation bilaterally. Heart has regular rate and rhythm with no murmurs, rubs, or gallops. Abdomen is nondistended, nontender, and soft. No hepatosplenomegaly or palpable masses noted. Genitourinary exam deferred. Skin exam reveals no abnormal moles. No palpable masses are noted.    There is no palpable cervical, axillary, or inguinal lymphadenopathy.     Laboratory Studies:  None today.          Echocardiogram 2021  Results for orders placed or performed during the hospital encounter of 04/20/21   Echo Pediatric (TTE) Complete     Status: None    Narrative    [id number]  [facility]  [id number]  [id number]^ARGELIA^BRYN^TERRANCE                                                               Study ID: [id number]                                                 [facility] [address]           "                                      [place] [address]                                                Phone: [phone]                                Pediatric Echocardiogram  ______________________________________________________________________________  Name: ANETA CHAMBERS  Study Date: 2021 01:52 PM             Patient Location: [facility]  MRN: [id number]                             Age: 26 yrs  : 1995  Gender: Female                              HR: 73  Patient Class: Outpatient                   Height: 158 cm  Ordering Provider: BRYN STOUT              Weight: 52 kg  Referring Provider: BRYN STOUT             BSA: 1.5 m2  Performed By: Marco Bal  Report approved by: Mackenzie Hernández MD  Reason For Study: Evaluate LVEF  ______________________________________________________________________________  ##### CONCLUSIONS #####  Normal left and right ventricular systolic function. The calculated biplane  left ventricular ejection fraction is 67%. Estimated right ventricular  systolic pressure is 19 mmHg plus right atrial pressure.  ______________________________________________________________________________  Technical information:  A complete two dimensional, MMODE, spectral and color Doppler transthoracic  echocardiogram is performed. The study quality is good. Images are obtained  from parasternal, apical, subcostal and suprasternal notch views. Prior  echocardiogram available for comparison. ECG tracing shows regular rhythm.     Segmental Anatomy:  There is normal atrial arrangement, with concordant atrioventricular and  ventriculoarterial connections.     Systemic and pulmonary veins:  Normal coronary sinus. The systemic venous return is not evaluated in this  study. The pulmonary venous return is not evaluated.     Atria and atrial septum:  Normal right atrial size. The left atrium is normal in size.     Atrioventricular valves:  The tricuspid valve is normal in appearance and  motion. Trivial tricuspid  valve insufficiency. Estimated right ventricular systolic pressure is 19 mmHg  plus right atrial pressure. The mitral valve is normal in appearance and  motion. Trivial mitral valve insufficiency.     Ventricles and Ventricular Septum:  Normal right ventricular size. Normal right ventricular systolic function.  Normal left ventricular size. Normal left ventricular systolic function. The  calculated biplane left ventricular ejection fraction is 69 %.     Outflow tracts:  Normal great artery relationship. There is unobstructed flow through the right  ventricular outflow tract. The pulmonary valve motion is normal. There is  normal flow across the pulmonary valve. Trivial pulmonary valve insufficiency.  There is unobstructed flow through the left ventricular outflow tract.  Tricuspid aortic valve with normal appearance and motion. There is normal flow  across the aortic valve.     Great arteries:  The main pulmonary artery has normal appearance. There is unobstructed flow in  the main pulmonary artery. The pulmonary artery bifurcation is normal. There  is unobstructed flow in both branch pulmonary arteries. Normal ascending  aorta. The aortic arch appears normal. There is a left aortic arch with normal  branching pattern. There is unobstructed antegrade flow in the ascending,  transverse arch, descending thoracic and abdominal aorta.     Effusions, catheters, cannulas and leads:  No pericardial effusion.     MMode/2D Measurements & Calculations  LA dimension: 3.1 cm                       Ao root diam: 2.6 cm     LA/Ao: 1.2                                             LVLd %diff: 6.2 %                                             LVLs %diff: -0.03 %                                             EF(MOD-bp): 69.3 %  LVMI(BSA): 85.6 grams/m2                   LVMI(Height): 37.6  RWT(MM): 0.42     Doppler Measurements & Calculations  Ao V2 max: 111.1 cm/sec                LV V1 max: 101.2 cm/sec  Ao  max P.9 mmHg                    LV V1 max P.1 mmHg  RV V1 max: 82.4 cm/sec  RV V1 max P.7 mmHg     MPA max amy: 83.9 cm/sec  MPA max P.8 mmHg     [place] Z-Scores (Measurements & Calculations)  Measurement NameValue      Z-ScorePredictedNormal Range  IVSd(MM)        0.88 cm    0.02   0.88     0.62 - 1.14  LVIDd(MM)       4.4 cm     -0.82  4.7      4.0 - 5.3  LVIDs(MM)       2.6 cm     -1.4   3.0      2.4 - 3.6  LVPWd(MM)       0.93 cm    0.99   0.82     0.61 - 1.04  LV mass(C)d(MM) 129.4 grams0.08   127.5    86.9 - 187.0  FS(MM)          41.3 %     1.6    34.8     28.3 - 42.7     Report approved by: Krys Lynch 2021 02:19 PM                 Assessment, Plan, and Long-Term Followup Recommendations: Overall, Cathie is doing extremely well status post her treatment for neuroblastoma that was approximately 21 years ago. BP was at the upper limits of normal but ok on the repeat. The following are our late effects recommendations.   1. Risk for recurrence: Cathie is currently 21 years status post treatment for her low-risk neuroblastoma. Given the distance from her diagnosis of the primary malignancy, the likelihood of recurrence is extremely low.    2. Psychosocial effects, learning, and memory: Cathie was treated with chemotherapy at a young age. However, she is doing extremely well in school. She has no concerns regarding her mood, and we feel she is at low risk for any psychosocial difficulties.     3. Risk for dental abnormalities: Cathie received chemotherapy at a young age which makes her at a slightly increased risk of having dental problems, including increased dental caries. Cathie currently has no cavities and reports brushing and visiting the dentist as per recommendations. She should continue to have dental examination and general cleaning every six months.   4. Risk for cardiac toxicities: Cathie received a low amount of doxorubicin in the area of 60 mg/m2 at the age of 4. Per the  [facility] guidelines, we recommend echocardiograms be completed every five years. She had an echocardiogram completed in 2011 which is within normal limits.  Repeat done today and normal.  We will perform again in 5 years.  5. Risk for kidney problems: Cathie should have an annual physical examination, including urinalysis and blood pressure monitoring.  Cathie was found to have benign orthostatic proteinuria at a prior visit. BMP and UA done with PCP recently normal.  No concerns on history.  Will continue to monitor.  6. Endocrinology issues: Cathie received a small amount of alkylating agents which has sometimes been found to affect fertility and pubertal progression. Cathie is having normal menstrual cycles.  If her cycles become irregular we would recommend checking gonadotropins.   7. Risk for hearing loss: Cathie was treated with the heavy metal agent carboplatin which does put patients at risk for high-frequency hearing loss. Cathie does not report any problems with her hearing, and she has been doing extremely well. We would recommend she have an audiogram should she notice any changes in her hearing.   8.  Katiuska's syndrome:  Mild and no current issues.  9.  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 Stable compared with prior.    It was a pleasure to see Cathie and her family today in the Long-Term Follow-Up Clinic. We certainly appreciate the opportunity to participate in your patient's care. If you have any questions or comments, please do not hesitate to contact us at [phone]. We ask that Cathie return to our Long-Term Follow-Up Clinic in 2 year's time.      Brii Foreman MSN, APRN, CPNP-AC, CPON  Department of Pediatrics  Division of Hematology/ Oncology    Review of the result(s) of each unique test - echocardiogram  30 minutes spent on the date of the encounter doing chart review, history and exam, documentation and further activities per the note

## 2021-04-20 NOTE — NURSING NOTE
"Chief Complaint   Patient presents with     RECHECK     Patient here today LTFU     /80 (BP Location: Right arm, Patient Position: Sitting, Cuff Size: Adult Regular)   Pulse 65   Temp 98.2  F (36.8  C) (Oral)   Resp 20   Ht 1.589 m (5' 2.56\")   Wt 51.3 kg (113 lb 1.5 oz)   SpO2 100%   BMI 20.32 kg/m      No Pain (0)  Data Unavailable    I have reviewed the patients medications and allergies    Height/weight double check needed? No            Ashley Tate, [facility]  April 20, 2021  "

## 2021-09-12 ENCOUNTER — HEALTH MAINTENANCE LETTER (OUTPATIENT)
Age: 26
End: 2021-09-12

## 2022-01-02 ENCOUNTER — HEALTH MAINTENANCE LETTER (OUTPATIENT)
Age: 27
End: 2022-01-02

## 2022-11-19 ENCOUNTER — HEALTH MAINTENANCE LETTER (OUTPATIENT)
Age: 27
End: 2022-11-19

## 2023-04-09 ENCOUNTER — HEALTH MAINTENANCE LETTER (OUTPATIENT)
Age: 28
End: 2023-04-09

## 2024-08-12 ENCOUNTER — OFFICE VISIT (OUTPATIENT)
Dept: PEDIATRIC HEMATOLOGY/ONCOLOGY | Facility: CLINIC | Age: 29
End: 2024-08-12
Attending: NURSE PRACTITIONER
Payer: OTHER GOVERNMENT

## 2024-08-12 VITALS
OXYGEN SATURATION: 100 % | WEIGHT: 125.66 LBS | BODY MASS INDEX: 22.27 KG/M2 | TEMPERATURE: 98.2 F | HEIGHT: 63 IN | SYSTOLIC BLOOD PRESSURE: 120 MMHG | DIASTOLIC BLOOD PRESSURE: 82 MMHG | RESPIRATION RATE: 18 BRPM | HEART RATE: 59 BPM

## 2024-08-12 DIAGNOSIS — F43.21 ADJUSTMENT DISORDER WITH DEPRESSED MOOD: ICD-10-CM

## 2024-08-12 DIAGNOSIS — F41.9 ANXIETY: ICD-10-CM

## 2024-08-12 DIAGNOSIS — H66.91 RIGHT ACUTE OTITIS MEDIA: ICD-10-CM

## 2024-08-12 DIAGNOSIS — Z92.21 STATUS POST CHEMOTHERAPY: ICD-10-CM

## 2024-08-12 DIAGNOSIS — Z85.858 PERSONAL HISTORY OF NEUROBLASTOMA: Primary | ICD-10-CM

## 2024-08-12 PROBLEM — F32.A DEPRESSION: Status: ACTIVE | Noted: 2024-08-12

## 2024-08-12 LAB
ALBUMIN SERPL BCG-MCNC: 4.4 G/DL (ref 3.5–5.2)
ALP SERPL-CCNC: 59 U/L (ref 40–150)
ALT SERPL W P-5'-P-CCNC: 14 U/L (ref 0–50)
ANION GAP SERPL CALCULATED.3IONS-SCNC: 10 MMOL/L (ref 7–15)
AST SERPL W P-5'-P-CCNC: 23 U/L (ref 0–45)
BASOPHILS # BLD AUTO: 0 10E3/UL (ref 0–0.2)
BASOPHILS NFR BLD AUTO: 1 %
BILIRUB SERPL-MCNC: 0.3 MG/DL
BUN SERPL-MCNC: 12.7 MG/DL (ref 6–20)
CALCIUM SERPL-MCNC: 9 MG/DL (ref 8.8–10.4)
CHLORIDE SERPL-SCNC: 105 MMOL/L (ref 98–107)
CREAT SERPL-MCNC: 0.76 MG/DL (ref 0.51–0.95)
EGFRCR SERPLBLD CKD-EPI 2021: >90 ML/MIN/1.73M2
EOSINOPHIL # BLD AUTO: 0.1 10E3/UL (ref 0–0.7)
EOSINOPHIL NFR BLD AUTO: 2 %
ERYTHROCYTE [DISTWIDTH] IN BLOOD BY AUTOMATED COUNT: 11.7 % (ref 10–15)
GLUCOSE SERPL-MCNC: 86 MG/DL (ref 70–99)
HCO3 SERPL-SCNC: 24 MMOL/L (ref 22–29)
HCT VFR BLD AUTO: 42.5 % (ref 35–47)
HGB BLD-MCNC: 15 G/DL (ref 11.7–15.7)
IMM GRANULOCYTES # BLD: 0 10E3/UL
IMM GRANULOCYTES NFR BLD: 0 %
LYMPHOCYTES # BLD AUTO: 2.4 10E3/UL (ref 0.8–5.3)
LYMPHOCYTES NFR BLD AUTO: 37 %
MCH RBC QN AUTO: 32.8 PG (ref 26.5–33)
MCHC RBC AUTO-ENTMCNC: 35.3 G/DL (ref 31.5–36.5)
MCV RBC AUTO: 93 FL (ref 78–100)
MONOCYTES # BLD AUTO: 0.4 10E3/UL (ref 0–1.3)
MONOCYTES NFR BLD AUTO: 5 %
NEUTROPHILS # BLD AUTO: 3.6 10E3/UL (ref 1.6–8.3)
NEUTROPHILS NFR BLD AUTO: 55 %
NRBC # BLD AUTO: 0 10E3/UL
NRBC BLD AUTO-RTO: 0 /100
PLATELET # BLD AUTO: 269 10E3/UL (ref 150–450)
POTASSIUM SERPL-SCNC: 4.3 MMOL/L (ref 3.4–5.3)
PROT SERPL-MCNC: 7.7 G/DL (ref 6.4–8.3)
RBC # BLD AUTO: 4.57 10E6/UL (ref 3.8–5.2)
SODIUM SERPL-SCNC: 139 MMOL/L (ref 135–145)
WBC # BLD AUTO: 6.5 10E3/UL (ref 4–11)

## 2024-08-12 PROCEDURE — 36415 COLL VENOUS BLD VENIPUNCTURE: CPT | Performed by: NURSE PRACTITIONER

## 2024-08-12 PROCEDURE — 99204 OFFICE O/P NEW MOD 45 MIN: CPT | Performed by: NURSE PRACTITIONER

## 2024-08-12 PROCEDURE — 84295 ASSAY OF SERUM SODIUM: CPT | Performed by: NURSE PRACTITIONER

## 2024-08-12 PROCEDURE — 99213 OFFICE O/P EST LOW 20 MIN: CPT | Performed by: NURSE PRACTITIONER

## 2024-08-12 PROCEDURE — 85041 AUTOMATED RBC COUNT: CPT | Performed by: NURSE PRACTITIONER

## 2024-08-12 RX ORDER — AMOXICILLIN 875 MG
875 TABLET ORAL 2 TIMES DAILY
Qty: 14 TABLET | Refills: 0 | Status: SHIPPED | OUTPATIENT
Start: 2024-08-12 | End: 2024-08-19

## 2024-08-12 ASSESSMENT — PAIN SCALES - GENERAL: PAINLEVEL: NO PAIN (0)

## 2024-08-12 NOTE — PROGRESS NOTES
We had the pleasure of seeing your patient, Cathie Hair ([place]: [place]), at the [facility] Long-Term Followup Clinic for childhood cancer survivors. The following is a summary of your patient's cancer, therapy, current history, and physical findings followed by assessment and long-term followup recommendations.     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 She was treated at the [facility] under the direction of Dr. Harris Pollock. Cathie was treated on study [facility] [place] , and treatment was completed on October 14, 1999. Cathie had one major surgical procedure involving resection of the right thoracic/paraspinal tumor on August 2, 1999. Additionally, she received chemotherapy as part of her treatment, and medications were as follows:   1. Cyclophosphamide intravenously with a cumulative dose of 1,980 mg/m2.   2. Etoposide intravenously with a cumulative dose of 1,080 mg/m2.   3. Doxorubicin intravenously with a cumulative dose of 60 mg/m2.   4. Carboplatin intravenously with a cumulative dose of 1.62 gm/m2.   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     History of Present Illness: Cathie reports to the visit alone.    She was last seen >3 years ago.  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  She is now working at [employer] in the [place] full time.  She got  in February 2023.   She still has the bump on her scalp but it hasn't changed.  No fever, night sweats or weight loss.  No joint pain.   Cathie denies recent illnesses or hospitalizations. Denies bruising, bleeding, or swollen lymph nodes.  She has been having regular follow up with dermatology for her moles and acne.  Is taking low dose accutane  Had 2 moles removed in January 2024 that were benign. Does  have skin checks every 6 months due to her atypical moles and cancer history.   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 There is a very mild ptosis and miosis on the right which is followed by ophthalmology yearly. She wears corrective lenses and has been doing so for the past 18 years.  She denies problems with hearing.  Cathie goes to the dentist twice a year and has no problems with dental caries or gum bleeding.   She denies exercise intolerance, dizziness or fainting spells. There are no abdominal, chest, or back pains. She denies fractures. Immunizations are  up-to-date. Appetite is good and sleeping well.  No N/V/D/C.  Regular menstrual periods.  Has been noticing right ear pain for several days and wondering if she has an ear infection.  No history of ear infections recently.  No recent antibiotics.  Had some depression/anxiety this year after SHERRILL passed and now taking zoloft.    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  Cathie started her menstrual cycles in 2011.  She denies any heavy bleeding or cramping.   She is very active at work and taking long walks, bike rides.  Family History: MGM with MI in the past.  Sepsis in the past year and she passes away from that. .  Cathie's maternal great-grandmother was diagnosed with diabetes.     Current Medications:   Current Outpatient Medications   Medication Sig  "Dispense Refill    amoxicillin (AMOXIL) 875 MG tablet Take 1 tablet (875 mg) by mouth 2 times daily for 7 days 14 tablet 0    fluticasone (FLONASE) 50 MCG/ACT nasal spray Spray 2 sprays in nostril      levonorgestrel-ethinyl estradiol (LUTERA) 0.1-20 MG-MCG per tablet Take 1 tablet by mouth daily 28 tablet 6    SPIRONOLACTONE PO Take 100 mg by mouth daily       No current facility-administered medications for this visit.       Allergies:      Allergies   Allergen Reactions    Cats     Hay Fever & [A.R.M.]     Morphine      restless and agitated         Physical Examination: /82   Pulse 59   Temp 98.2  F (36.8  C) (Oral)   Resp 18   Ht 1.591 m (5' 2.64\")   Wt 57 kg (125 lb 10.6 oz)   SpO2 100%   BMI 22.52 kg/m      Wt Readings from Last 3 Encounters:   08/12/24 57 kg (125 lb 10.6 oz)   04/20/21 51.3 kg (113 lb 1.5 oz)   08/09/19 52 kg (114 lb 10.2 oz)     Ht Readings from Last 2 Encounters:   08/12/24 1.591 m (5' 2.64\")   04/20/21 1.589 m (5' 2.56\")     Facility age limit for growth %dwight is 20 years.      In general, Cathie appears to be in no acute distress.   Sclerae are anicteric. Pupils are round and reactive to light, L>R. Left  tympanic membrane within normal limits. Right TM bulging with effusion and erythema.  Oropharynx is clear without lesions. Neck is supple, and thyroid is nonpalpable. Lungs are clear to auscultation bilaterally. Heart has regular rate and rhythm with no murmurs, rubs, or gallops. Abdomen is nondistended, nontender, and soft. No hepatosplenomegaly or palpable masses noted. Genitourinary exam deferred. Skin exam reveals no abnormal moles. No palpable masses are noted.    There is no palpable cervical, axillary, or inguinal lymphadenopathy.     Laboratory Studies:  Results for orders placed or performed in visit on 08/12/24   CBC with platelets and differential     Status: None   Result Value Ref Range    WBC Count 6.5 4.0 - 11.0 10e3/uL    RBC Count 4.57 3.80 - 5.20 10e6/uL    " Hemoglobin 15.0 11.7 - 15.7 g/dL    Hematocrit 42.5 35.0 - 47.0 %    MCV 93 78 - 100 fL    MCH 32.8 26.5 - 33.0 pg    MCHC 35.3 31.5 - 36.5 g/dL    RDW 11.7 10.0 - 15.0 %    Platelet Count 269 150 - 450 10e3/uL    % Neutrophils 55 %    % Lymphocytes 37 %    % Monocytes 5 %    % Eosinophils 2 %    % Basophils 1 %    % Immature Granulocytes 0 %    NRBCs per 100 WBC 0 <1 /100    Absolute Neutrophils 3.6 1.6 - 8.3 10e3/uL    Absolute Lymphocytes 2.4 0.8 - 5.3 10e3/uL    Absolute Monocytes 0.4 0.0 - 1.3 10e3/uL    Absolute Eosinophils 0.1 0.0 - 0.7 10e3/uL    Absolute Basophils 0.0 0.0 - 0.2 10e3/uL    Absolute Immature Granulocytes 0.0 <=0.4 10e3/uL    Absolute NRBCs 0.0 10e3/uL   CBC with platelets differential     Status: None    Narrative    The following orders were created for panel order CBC with platelets differential.  Procedure                               Abnormality         Status                     ---------                               -----------         ------                     CBC with platelets and d...[[id number]]                      Final result                 Please view results for these tests on the individual orders.             Echocardiogram 2021 WNL    Assessment, Plan, and Long-Term Followup Recommendations: Overall, Cathie is doing extremely well status post her treatment for neuroblastoma that was approximately 24 years ago. BP was at the upper limits of normal but ok on the repeat.  Right AOM and we will treat with amoxicillin. The following are our late effects recommendations.   1. Risk for recurrence: Cathie is currently 26 years status post treatment for her low-risk neuroblastoma. Given the distance from her diagnosis of the primary malignancy, the likelihood of recurrence is extremely low.    2. Psychosocial effects, learning, and memory: Cathie was treated with chemotherapy at a young age. However, she is doing extremely well in school. She has no concerns regarding her mood, and we  feel she is at low risk for any psychosocial difficulties.     3. Risk for dental abnormalities: Cathie received chemotherapy at a young age which makes her at a slightly increased risk of having dental problems, including increased dental caries. Cathie currently has no cavities and reports brushing and visiting the dentist as per recommendations. She should continue to have dental examination and general cleaning every six months.   4. Risk for cardiac toxicities: Cathie received a low amount of doxorubicin in the area of 60 mg/m2 at the age of 4. Per the updated [facility] guidelines, we recommend echocardiograms be on an as needed basis. No regularly scheduled surveillance now required.  5. Risk for kidney problems: Cathie should have an annual physical examination, including urinary history and blood pressure monitoring.  Cathie was found to have benign orthostatic proteinuria at a prior visit. BP at ULN and CMP WNL today.  6. Endocrinology issues: Cathie received a small amount of alkylating agents which has sometimes been found to affect fertility and pubertal progression. Cathie is having normal menstrual cycles.  If her cycles become irregular we would recommend checking gonadotropins. Discussed that her fertility is likely intact but should consider having testing if she is unsuccessful getting pregnant on her own after 6-9 months.  7. Risk for hearing loss: Cathie was treated with the heavy metal agent carboplatin which does put patients at risk for high-frequency hearing loss. Cathie does not report any problems with her hearing, and she has been doing extremely well. We would recommend she have an audiogram should she notice any changes in her hearing.   8.  Katiuska's syndrome:  Mild and no current issues.  9.  Right AOM:  has ear pain on the right and infection noted.  Will start Amoxicillin 875 mg q12h x 7 days    It was a pleasure to see Cathie and her family today in the Long-Term Follow-Up Clinic. We  certainly appreciate the opportunity to participate in your patient's care. If you have any questions or comments, please do not hesitate to contact us at [phone]. We ask that Cathie return to our Long-Term Follow-Up Clinic in 2 year's time.      Brii Foreman MSN, APRN, CPNP-AC, CPON  Department of Pediatrics  Division of Hematology/ Oncology    Review of the result(s) of each unique test - CBC, CMP  Total time spent on the following services on the date of the encounter:  Preparing to see patient, chart review, review of outside records, Ordering medications, test, procedures, chemotherapy, Interpretation of labs, imaging and other tests, Performing a medically appropriate examination , Counseling and educating the patient/family/caregiver , Documenting clinical information in the electronic or other health record , Communicating results to the patient/family/caregiver , and Total time spent: 45 min

## 2024-08-12 NOTE — NURSING NOTE
"Chief Complaint   Patient presents with    RECHECK     Patient here today for long term follow up     /86 (BP Location: Right arm, Patient Position: Sitting, Cuff Size: Adult Regular)   Pulse 59   Temp 98.2  F (36.8  C) (Oral)   Resp 18   Ht 1.591 m (5' 2.64\")   Wt 57 kg (125 lb 10.6 oz)   SpO2 100%   BMI 22.52 kg/m      No Pain (0)  Data Unavailable    I have reviewed the patients medications and allergies    Height/weight double check needed? No    Peds Outpatient BP  1) Rested for 5 minutes, BP taken on bare arm, patient sitting (or supine for infants) w/ legs uncrossed?   Yes  2) Right arm used?  Right arm   Yes  3) Arm circumference of largest part of upper arm (in cm): 27  4) BP cuff sized used: Adult (25-32cm)   If used different size cuff then what was recommended why? N/A  5) First BP reading:machine   BP Readings from Last 1 Encounters:   08/12/24 127/86      Is reading >90%?No   (90% for <1 years is 90/50)  (90% for >18 years is 140/90)  *If a machine BP is at or above 90% take manual BP  6) Manual BP reading: N/A  7) Other comments: None          Geoff Huber  August 12, 2024    "

## 2024-08-12 NOTE — LETTER
8/12/2024      RE: Cathie Hair  [address] [place] [address]     Dear Colleague,    Thank you for the opportunity to participate in the care of your patient, Cathie Hair, at the [facility] at [facility]. Please see a copy of my visit note below.    We had the pleasure of seeing your patient, Cathie Hair ([place]: [place]), at the [facility] Long-Term Followup Clinic for childhood cancer survivors. The following is a summary of your patient's cancer, therapy, current history, and physical findings followed by assessment and long-term followup recommendations.     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 She was treated at the [facility] under the direction of Dr. Harris Pollock. Cathie was treated on study [facility] , and treatment was completed on October 14, 1999. Cathie had one major surgical procedure involving resection of the right thoracic/paraspinal tumor on August 2, 1999. Additionally, she received chemotherapy as part of her treatment, and medications were as follows:   1. Cyclophosphamide intravenously with a cumulative dose of 1,980 mg/m2.   2. Etoposide intravenously with a cumulative dose of 1,080 mg/m2.   3. Doxorubicin intravenously with a cumulative dose of 60 mg/m2.   4. Carboplatin intravenously with a cumulative dose of 1.62 gm/m2.   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     History of Present Illness: Cathie reports to the visit alone.    She was last seen >3 years ago.  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  She is now working at [employer] in the [place] full time.  She got  in February 2023.   She still has the bump on her scalp but  it hasn't changed.  No fever, night sweats or weight loss.  No joint pain.   Cathie denies recent illnesses or hospitalizations. Denies bruising, bleeding, or swollen lymph nodes.  She has been having regular follow up with dermatology for her moles and acne.  Is taking low dose accutane  Had 2 moles removed in January 2024 that were benign. Does have skin checks every 6 months due to her atypical moles and cancer history.   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 There is a very mild ptosis and miosis on the right which is followed by ophthalmology yearly. She wears corrective lenses and has been doing so for the past 18 years.  She denies problems with hearing.  Cathie goes to the dentist twice a year and has no problems with dental caries or gum bleeding.   She denies exercise intolerance, dizziness or fainting spells. There are no abdominal, chest, or back pains. She denies fractures. Immunizations are  up-to-date. Appetite is good and sleeping well.  No N/V/D/C.  Regular menstrual periods.  Has been noticing right ear pain for several days and wondering if she has an ear infection.  No history of ear infections recently.  No recent antibiotics.  Had some depression/anxiety this year after MGM passed and now taking zoloft.    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  Cathie started her menstrual cycles in 2011.   "She denies any heavy bleeding or cramping.   She is very active at work and taking long walks, bike rides.  Family History: MGM with MI in the past.  Sepsis in the past year and she passes away from that. .  Cathie's maternal great-grandmother was diagnosed with diabetes.     Current Medications:   Current Outpatient Medications   Medication Sig Dispense Refill     amoxicillin (AMOXIL) 875 MG tablet Take 1 tablet (875 mg) by mouth 2 times daily for 7 days 14 tablet 0     fluticasone (FLONASE) 50 MCG/ACT nasal spray Spray 2 sprays in nostril       levonorgestrel-ethinyl estradiol (LUTERA) 0.1-20 MG-MCG per tablet Take 1 tablet by mouth daily 28 tablet 6     SPIRONOLACTONE PO Take 100 mg by mouth daily       No current facility-administered medications for this visit.       Allergies:      Allergies   Allergen Reactions     Cats      Hay Fever & [A.R.M.]      Morphine      restless and agitated         Physical Examination: /82   Pulse 59   Temp 98.2  F (36.8  C) (Oral)   Resp 18   Ht 1.591 m (5' 2.64\")   Wt 57 kg (125 lb 10.6 oz)   SpO2 100%   BMI 22.52 kg/m      Wt Readings from Last 3 Encounters:   08/12/24 57 kg (125 lb 10.6 oz)   04/20/21 51.3 kg (113 lb 1.5 oz)   08/09/19 52 kg (114 lb 10.2 oz)     Ht Readings from Last 2 Encounters:   08/12/24 1.591 m (5' 2.64\")   04/20/21 1.589 m (5' 2.56\")     Facility age limit for growth %dwight is 20 years.      In general, Cathie appears to be in no acute distress.   Sclerae are anicteric. Pupils are round and reactive to light, L>R. Left  tympanic membrane within normal limits. Right TM bulging with effusion and erythema.  Oropharynx is clear without lesions. Neck is supple, and thyroid is nonpalpable. Lungs are clear to auscultation bilaterally. Heart has regular rate and rhythm with no murmurs, rubs, or gallops. Abdomen is nondistended, nontender, and soft. No hepatosplenomegaly or palpable masses noted. Genitourinary exam deferred. Skin exam reveals no abnormal " moles. No palpable masses are noted.    There is no palpable cervical, axillary, or inguinal lymphadenopathy.     Laboratory Studies:  Results for orders placed or performed in visit on 08/12/24   CBC with platelets and differential     Status: None   Result Value Ref Range    WBC Count 6.5 4.0 - 11.0 10e3/uL    RBC Count 4.57 3.80 - 5.20 10e6/uL    Hemoglobin 15.0 11.7 - 15.7 g/dL    Hematocrit 42.5 35.0 - 47.0 %    MCV 93 78 - 100 fL    MCH 32.8 26.5 - 33.0 pg    MCHC 35.3 31.5 - 36.5 g/dL    RDW 11.7 10.0 - 15.0 %    Platelet Count 269 150 - 450 10e3/uL    % Neutrophils 55 %    % Lymphocytes 37 %    % Monocytes 5 %    % Eosinophils 2 %    % Basophils 1 %    % Immature Granulocytes 0 %    NRBCs per 100 WBC 0 <1 /100    Absolute Neutrophils 3.6 1.6 - 8.3 10e3/uL    Absolute Lymphocytes 2.4 0.8 - 5.3 10e3/uL    Absolute Monocytes 0.4 0.0 - 1.3 10e3/uL    Absolute Eosinophils 0.1 0.0 - 0.7 10e3/uL    Absolute Basophils 0.0 0.0 - 0.2 10e3/uL    Absolute Immature Granulocytes 0.0 <=0.4 10e3/uL    Absolute NRBCs 0.0 10e3/uL   CBC with platelets differential     Status: None    Narrative    The following orders were created for panel order CBC with platelets differential.  Procedure                               Abnormality         Status                     ---------                               -----------         ------                     CBC with platelets and d...[[id number]]                      Final result                 Please view results for these tests on the individual orders.             Echocardiogram 2021 WNL    Assessment, Plan, and Long-Term Followup Recommendations: Overall, Cathie is doing extremely well status post her treatment for neuroblastoma that was approximately 24 years ago. BP was at the upper limits of normal but ok on the repeat.  Right AOM and we will treat with amoxicillin. The following are our late effects recommendations.   1. Risk for recurrence: Cathie is currently 26 years status  post treatment for her low-risk neuroblastoma. Given the distance from her diagnosis of the primary malignancy, the likelihood of recurrence is extremely low.    2. Psychosocial effects, learning, and memory: Cathie was treated with chemotherapy at a young age. However, she is doing extremely well in school. She has no concerns regarding her mood, and we feel she is at low risk for any psychosocial difficulties.     3. Risk for dental abnormalities: Cathie received chemotherapy at a young age which makes her at a slightly increased risk of having dental problems, including increased dental caries. Cathie currently has no cavities and reports brushing and visiting the dentist as per recommendations. She should continue to have dental examination and general cleaning every six months.   4. Risk for cardiac toxicities: Cathie received a low amount of doxorubicin in the area of 60 mg/m2 at the age of 4. Per the updated [facility] guidelines, we recommend echocardiograms be on an as needed basis. No regularly scheduled surveillance now required.  5. Risk for kidney problems: Cathie should have an annual physical examination, including urinary history and blood pressure monitoring.  Cathie was found to have benign orthostatic proteinuria at a prior visit. BP at ULN and CMP WNL today.  6. Endocrinology issues: Cathie received a small amount of alkylating agents which has sometimes been found to affect fertility and pubertal progression. Cathie is having normal menstrual cycles.  If her cycles become irregular we would recommend checking gonadotropins. Discussed that her fertility is likely intact but should consider having testing if she is unsuccessful getting pregnant on her own after 6-9 months.  7. Risk for hearing loss: Cathie was treated with the heavy metal agent carboplatin which does put patients at risk for high-frequency hearing loss. Cathie does not report any problems with her hearing, and she has been doing  extremely well. We would recommend she have an audiogram should she notice any changes in her hearing.   8.  Katiuska's syndrome:  Mild and no current issues.  9.  Right AOM:  has ear pain on the right and infection noted.  Will start Amoxicillin 875 mg q12h x 7 days    It was a pleasure to see Cathie and her family today in the Long-Term Follow-Up Clinic. We certainly appreciate the opportunity to participate in your patient's care. If you have any questions or comments, please do not hesitate to contact us at [phone]. We ask that Cathie return to our Long-Term Follow-Up Clinic in 2 year's time.      Brii Foreman MSN, APRN, CPNP-AC, CPON  Department of Pediatrics  Division of Hematology/ Oncology    Review of the result(s) of each unique test - CBC, CMP  Total time spent on the following services on the date of the encounter:  Preparing to see patient, chart review, review of outside records, Ordering medications, test, procedures, chemotherapy, Interpretation of labs, imaging and other tests, Performing a medically appropriate examination , Counseling and educating the patient/family/caregiver , Documenting clinical information in the electronic or other health record , Communicating results to the patient/family/caregiver , and Total time spent: 45 min          Please do not hesitate to contact me if you have any questions/concerns.     Sincerely,       PASCUAL Torres CNP

## 2024-08-24 ENCOUNTER — HEALTH MAINTENANCE LETTER (OUTPATIENT)
Age: 29
End: 2024-08-24